# Patient Record
Sex: MALE | Race: ASIAN | NOT HISPANIC OR LATINO
[De-identification: names, ages, dates, MRNs, and addresses within clinical notes are randomized per-mention and may not be internally consistent; named-entity substitution may affect disease eponyms.]

---

## 2021-01-05 ENCOUNTER — APPOINTMENT (OUTPATIENT)
Dept: UROLOGY | Facility: CLINIC | Age: 66
End: 2021-01-05

## 2021-01-19 ENCOUNTER — APPOINTMENT (OUTPATIENT)
Dept: UROLOGY | Facility: CLINIC | Age: 66
End: 2021-01-19
Payer: MEDICARE

## 2021-01-19 VITALS
SYSTOLIC BLOOD PRESSURE: 114 MMHG | DIASTOLIC BLOOD PRESSURE: 73 MMHG | BODY MASS INDEX: 25.66 KG/M2 | HEART RATE: 83 BPM | WEIGHT: 154 LBS | RESPIRATION RATE: 18 BRPM | TEMPERATURE: 98.3 F | HEIGHT: 65 IN | OXYGEN SATURATION: 96 %

## 2021-01-19 DIAGNOSIS — Z78.9 OTHER SPECIFIED HEALTH STATUS: ICD-10-CM

## 2021-01-19 PROCEDURE — 99072 ADDL SUPL MATRL&STAF TM PHE: CPT

## 2021-01-19 PROCEDURE — 99204 OFFICE O/P NEW MOD 45 MIN: CPT

## 2021-01-19 RX ORDER — LATANOPROST/PF 0.005 %
0.01 DROPS OPHTHALMIC (EYE)
Refills: 0 | Status: ACTIVE | COMMUNITY

## 2021-01-19 RX ORDER — ARTIFICIAL TEARS 1; 2; 3 MG/ML; MG/ML; MG/ML
SOLUTION/ DROPS OPHTHALMIC
Refills: 0 | Status: ACTIVE | COMMUNITY

## 2021-01-19 RX ORDER — LORATADINE 10 MG/1
10 TABLET ORAL
Refills: 0 | Status: ACTIVE | COMMUNITY

## 2021-01-19 RX ORDER — OMEPRAZOLE 20 MG/1
20 CAPSULE, DELAYED RELEASE ORAL
Refills: 0 | Status: ACTIVE | COMMUNITY

## 2021-01-19 RX ORDER — ISOPROPYL ALCOHOL 70 ML/100ML
SWAB TOPICAL
Refills: 0 | Status: ACTIVE | COMMUNITY

## 2021-01-19 RX ORDER — GLIPIZIDE 10 MG/1
10 TABLET, FILM COATED, EXTENDED RELEASE ORAL
Refills: 0 | Status: ACTIVE | COMMUNITY

## 2021-01-19 RX ORDER — ATORVASTATIN CALCIUM 40 MG/1
40 TABLET, FILM COATED ORAL
Refills: 0 | Status: ACTIVE | COMMUNITY

## 2021-01-19 RX ORDER — SITAGLIPTIN AND METFORMIN HYDROCHLORIDE 50; 500 MG/1; MG/1
50-500 TABLET, FILM COATED ORAL
Refills: 0 | Status: ACTIVE | COMMUNITY

## 2021-01-19 RX ORDER — LANCETS 33 GAUGE
EACH MISCELLANEOUS
Refills: 0 | Status: ACTIVE | COMMUNITY

## 2021-01-19 RX ORDER — LOSARTAN POTASSIUM 100 MG/1
100 TABLET, FILM COATED ORAL
Refills: 0 | Status: ACTIVE | COMMUNITY

## 2021-01-19 RX ORDER — ACETAMINOPHEN 325 MG/1
325 TABLET ORAL
Refills: 0 | Status: ACTIVE | COMMUNITY

## 2021-01-19 RX ORDER — ICOSAPENT ETHYL 1000 MG/1
1 CAPSULE ORAL
Refills: 0 | Status: ACTIVE | COMMUNITY

## 2021-01-19 RX ORDER — TRIAMCINOLONE ACETONIDE 1 MG/ML
0.1 LOTION TOPICAL
Refills: 0 | Status: ACTIVE | COMMUNITY

## 2021-01-19 RX ORDER — OLOPATADINE HYDROCHLORIDE 2 MG/ML
0.2 SOLUTION OPHTHALMIC
Refills: 0 | Status: ACTIVE | COMMUNITY

## 2021-01-19 RX ORDER — AMOXICILLIN 500 MG/1
500 TABLET, FILM COATED ORAL
Refills: 0 | Status: ACTIVE | COMMUNITY

## 2021-01-19 RX ORDER — EMPAGLIFLOZIN 25 MG/1
25 TABLET, FILM COATED ORAL
Refills: 0 | Status: ACTIVE | COMMUNITY

## 2021-01-19 RX ORDER — ENEMA 19; 7 G/133ML; G/133ML
7-19 ENEMA RECTAL
Qty: 2 | Refills: 0 | Status: ACTIVE | COMMUNITY
Start: 2021-01-19 | End: 1900-01-01

## 2021-01-19 RX ORDER — BENZONATATE 200 MG/1
200 CAPSULE ORAL
Refills: 0 | Status: ACTIVE | COMMUNITY

## 2021-01-19 NOTE — ADDENDUM
[FreeTextEntry1] : Entered by Klaus Browne, acting as scribe for Dr. Edmond Patel.\par \par The documentation recorded by the scribe accurately reflects the service I personally performed and the decisions made by me.\par

## 2021-01-19 NOTE — LETTER BODY
[FreeTextEntry1] : Rupert Ozuna MD\par [address] [facility] [address],\[place] [address]\par [phone]\par \par Dear Dr. Ozuna,\par \par Reason for Visit: Prostate cancer.\par \par This is a 65 year-old Mandarin-speaking gentleman with prostate cancer. Patient is referred for evaluation of his condition. The patient was diagnosed with prostate cancer in 2019. He underwent two prostate biopsies with Dr. Chacko, both of which demonstrated small portions of [place] 6 adenocarcinoma of the prostate. Patient denies any gross hematuria or dysuria or urinary incontinence. The patient denies any aggravating or relieving factors. The patient denies any interference of function. The patient is entirely asymptomatic. All other review of systems are negative. He has no cancer in his family medical history. He has had appendectomy and prostate biopsy. Past medical history, family history and social history were inquired and were noncontributory to current condition. The patient does not use tobacco or drink alcohol. Medications and allergies were reviewed. He has no known allergies to medication. \par \par On examination, the patient is a healthy-appearing gentleman in no acute distress. He is alert and oriented follows commands. He has normal mood and affect. He is normocephalic. Oral no thrush. Neck is supple. Respirations are unlabored. His abdomen is soft and nontender. Liver is nonpalpable. Bladder is nonpalpable. No CVA tenderness. Neurologically he is grossly intact. No peripheral edema. Skin without gross abnormality. He has normal male external genitalia. Normal meatus. Bilateral testes are descended intrascrotally and normal to palpation. On rectal examination, there is normal sphincter tone. The prostate is clinically benign without focal induration or nodularity.\par \par Assessment: Prostate cancer.\par \par I counseled the patient. I discussed the treatment options available to him including expectant management, hormonal therapy, radiation, and surgery. The risks and benefits of each options were discussed in detail as well quality of life issues. The patient wishes to proceed with watchful waiting of his prostate cancer. I recommended he undergo prostate MRI. I counseled the patient regarding the procedure. The risks and benefits were discussed. Alternatives were given. I answered the patient questions. The patient will take the necessary preparations for the procedure, including fleet enema. He will obtain BMP and PSA today to ensure stability. The patient will follow-up in 6 months for serial PSA testing and active surveillance of his prostate cancer. Risks and alternatives were discussed. I answered the patient questions. The patient will follow-up as directed and will contact me with any questions or concerns. Thank you for the opportunity to participate in the care of Mr. ROBERTSON. I will keep you updated on his progress.\par \par Plan: BMP. PSA. Prostate MRI. Fleet enema. Follow-up in 6 months.

## 2021-01-20 LAB
ANION GAP SERPL CALC-SCNC: 21 MMOL/L
APPEARANCE: CLEAR
BACTERIA: NEGATIVE
BILIRUBIN URINE: NEGATIVE
BLOOD URINE: NEGATIVE
BUN SERPL-MCNC: 16 MG/DL
CALCIUM SERPL-MCNC: 10 MG/DL
CHLORIDE SERPL-SCNC: 99 MMOL/L
CO2 SERPL-SCNC: 22 MMOL/L
COLOR: NORMAL
CREAT SERPL-MCNC: 0.66 MG/DL
GLUCOSE QUALITATIVE U: ABNORMAL
GLUCOSE SERPL-MCNC: 110 MG/DL
HYALINE CASTS: 0 /LPF
KETONES URINE: NEGATIVE
LEUKOCYTE ESTERASE URINE: NEGATIVE
MICROSCOPIC-UA: NORMAL
NITRITE URINE: NEGATIVE
PH URINE: 6.5
POTASSIUM SERPL-SCNC: 4 MMOL/L
PROTEIN URINE: NEGATIVE
PSA FREE FLD-MCNC: 14 %
PSA FREE SERPL-MCNC: 0.91 NG/ML
PSA SERPL-MCNC: 6.66 NG/ML
RED BLOOD CELLS URINE: 0 /HPF
SODIUM SERPL-SCNC: 142 MMOL/L
SPECIFIC GRAVITY URINE: 1.03
SQUAMOUS EPITHELIAL CELLS: 0 /HPF
UROBILINOGEN URINE: NORMAL
WHITE BLOOD CELLS URINE: 0 /HPF

## 2021-02-03 LAB — URINE CYTOLOGY: NORMAL

## 2021-02-22 ENCOUNTER — NON-APPOINTMENT (OUTPATIENT)
Age: 66
End: 2021-02-22

## 2021-03-02 ENCOUNTER — APPOINTMENT (OUTPATIENT)
Dept: UROLOGY | Facility: CLINIC | Age: 66
End: 2021-03-02
Payer: COMMERCIAL

## 2021-03-02 VITALS
DIASTOLIC BLOOD PRESSURE: 88 MMHG | BODY MASS INDEX: 25.96 KG/M2 | RESPIRATION RATE: 18 BRPM | TEMPERATURE: 97.7 F | SYSTOLIC BLOOD PRESSURE: 132 MMHG | HEART RATE: 93 BPM | WEIGHT: 156 LBS | OXYGEN SATURATION: 95 %

## 2021-03-02 PROCEDURE — 99072 ADDL SUPL MATRL&STAF TM PHE: CPT

## 2021-03-02 PROCEDURE — 99214 OFFICE O/P EST MOD 30 MIN: CPT

## 2021-03-02 NOTE — LETTER BODY
[FreeTextEntry1] : Rupert Ozuna MD\par [address] [facility] [address],\[place] [address]\par [phone]\par \par Dear Dr. Ozuna,\par \par Reason for Visit: Prostate cancer.\par \par This is a 65 year-old Mandarin-speaking gentleman with prostate cancer. He was diagnosed with prostate cancer in 2019. He underwent two prostate biopsies with Dr. Chacko, both of which demonstrated small portions of [place] 6 adenocarcinoma of the prostate. The patient returns today for follow-up. Since his last visit, the patient underwent prostate MRI in February, which demonstrated an enlarged prostate gland with an intermediate prostatic lesion, PI-RADS 3. Patient denies any gross hematuria or dysuria or urinary incontinence. He denies any interference of function. The patient is entirely asymptomatic. Patient denies any changes in health. All other review of systems are negative. Past medical history, family history and social history were inquired and were unchanged. Medications and allergies were reviewed. He has no known allergies to medication. \par \par On examination, the patient is a healthy-appearing gentleman in no acute distress. He is alert and oriented follows commands. He has normal mood and affect. He is normocephalic. Oral no thrush. Neck is supple. Respirations are unlabored. His abdomen is soft and nontender. Liver is nonpalpable. Bladder is nonpalpable. No CVA tenderness. Neurologically he is grossly intact. No peripheral edema. Skin without gross abnormality. He has normal male external genitalia. Normal meatus. Bilateral testes are descended intrascrotally and normal to palpation. On rectal examination, there is normal sphincter tone. The prostate is clinically benign without focal induration or nodularity.\par \par I personally reviewed MRI prostate images with the patient today and images demonstrated a moderate to markedly enlarged prostate gland of 81 cc with an intermediate 7 mm prostatic lesion, PI-RADS 3. His PSA density is age-appropriate.\par \par Assessment: Prostate cancer on active surveillance \par \par I counseled the patient. His prostate MRI demonstrated an enlarged prostate gland with an intermediate prostatic lesion, PI-RADS 3. However, his PSA density is age-appropriate. I discussed with him the risk of occult malignancy. I also discussed with the patient that he is a favorable candidate for active surveillance. The patient wishes to proceed with active surveillance of his prostate cancer. He will follow-up in 6 months. Risks and alternatives were discussed. I answered the patient questions. The patient will follow-up as directed and will contact me with any questions or concerns. Thank you for the opportunity to participate in the care of Mr. ROBERTSON. I will keep you updated on his progress.\par \par Plan: Follow-up in 6 months.\par \par I spent 30-minutes time today on all issues related to this encounter on today date of service including non face to face time.

## 2021-09-07 ENCOUNTER — APPOINTMENT (OUTPATIENT)
Dept: UROLOGY | Facility: CLINIC | Age: 66
End: 2021-09-07

## 2021-09-15 ENCOUNTER — APPOINTMENT (OUTPATIENT)
Dept: UROLOGY | Facility: CLINIC | Age: 66
End: 2021-09-15

## 2022-05-27 ENCOUNTER — APPOINTMENT (OUTPATIENT)
Dept: UROLOGY | Facility: CLINIC | Age: 67
End: 2022-05-27
Payer: MEDICARE

## 2022-05-27 VITALS
OXYGEN SATURATION: 95 % | TEMPERATURE: 98.5 F | RESPIRATION RATE: 18 BRPM | SYSTOLIC BLOOD PRESSURE: 110 MMHG | HEART RATE: 85 BPM | DIASTOLIC BLOOD PRESSURE: 63 MMHG | WEIGHT: 157 LBS | BODY MASS INDEX: 26.13 KG/M2

## 2022-05-27 DIAGNOSIS — C61 MALIGNANT NEOPLASM OF PROSTATE: ICD-10-CM

## 2022-05-27 DIAGNOSIS — Z00.00 ENCOUNTER FOR GENERAL ADULT MEDICAL EXAMINATION W/OUT ABNORMAL FINDINGS: ICD-10-CM

## 2022-05-27 PROCEDURE — 99214 OFFICE O/P EST MOD 30 MIN: CPT

## 2022-05-27 RX ORDER — TAMSULOSIN HYDROCHLORIDE 0.4 MG/1
0.4 CAPSULE ORAL
Qty: 180 | Refills: 3 | Status: ACTIVE | COMMUNITY
Start: 1900-01-01 | End: 1900-01-01

## 2022-05-28 LAB
ANION GAP SERPL CALC-SCNC: 14 MMOL/L
BUN SERPL-MCNC: 19 MG/DL
CALCIUM SERPL-MCNC: 10 MG/DL
CHLORIDE SERPL-SCNC: 98 MMOL/L
CO2 SERPL-SCNC: 27 MMOL/L
CREAT SERPL-MCNC: 0.62 MG/DL
EGFR: 105 ML/MIN/1.73M2
GLUCOSE SERPL-MCNC: 209 MG/DL
POTASSIUM SERPL-SCNC: 4.8 MMOL/L
PSA FREE FLD-MCNC: 14 %
PSA FREE SERPL-MCNC: 0.93 NG/ML
PSA SERPL-MCNC: 6.43 NG/ML
SODIUM SERPL-SCNC: 140 MMOL/L

## 2022-05-30 NOTE — LETTER BODY
[FreeTextEntry1] : Rupert Ozuna MD\par [address] [facility] [address],\[place] [address]\par [phone]\par \par Dear Dr. Ozuna,\par \par Reason for Visit: Prostate cancer. BPH. \par \par This is a 67 year-old Mandarin-speaking gentleman with prostate cancer and BPH. He was diagnosed with prostate cancer in 2019. He underwent two prostate biopsies with Dr. Chacko, both of which demonstrated small portions of [place] 6 adenocarcinoma of the prostate. His prostate MRI  in February 2021 demonstrated an enlarged prostate gland with an intermediate prostatic lesion, PI-RADS 3. The patient returns today for follow-up. Since his last visit, the patient reports taking Flomax QD regularly without any side effects or difficulties with the medication. He notes good uroflow and stable urinary symptoms on medical therapy. Patient denies any gross hematuria or dysuria or urinary incontinence. The patient denies any changes in health. All other review of systems are negative. Past medical history, family history and social history were inquired and were unchanged. Medications and allergies were reviewed. He has no known allergies to medication. \par \par On examination, the patient is a healthy-appearing gentleman in no acute distress. He is alert and oriented follows commands. He has normal mood and affect. He is normocephalic. Oral no thrush. Neck is supple. Respirations are unlabored. His abdomen is soft and nontender. Liver is nonpalpable. Bladder is nonpalpable. No CVA tenderness. Neurologically he is grossly intact. No peripheral edema. Skin without gross abnormality. He has normal male external genitalia. Normal meatus. Bilateral testes are descended intrascrotally and normal to palpation. On rectal examination, there is normal sphincter tone. The prostate is clinically benign without focal induration or nodularity.\par \par His CMP demonstrated normal renal functions, creatinine 0.58. His PSA was 5.6, which is elevated but age appropriate. \par \par Assessment: Prostate cancer on active surveillance. BPH, stable symptoms on Flomax QD. \par \par I counseled the patient. He is doing well. In terms of his prostate cancer, I discussed with the patient that he is a favorable candidate for active surveillance. I recommended he repeat PSA and BMP today to ensure stability. In terms of his BPH, the patient reports stable urinary symptoms on medical therapy.  I recommended the patient continue taking Flomax QD. I renewed the patient's prescription for Flomax today. I encouraged the patient to continue medications regularly as directed. Risks and alternatives were discussed. I answered the patient questions. The patient will follow-up as directed and will contact me with any questions or concerns. Thank you for the opportunity to participate in the care of Mr. ROBERTSON. I will keep you updated on his progress.\par \par Plan: Continue Flomax. PSA. BMP. Follow-up in 6 months.

## 2022-05-30 NOTE — ADDENDUM
[FreeTextEntry1] : Entered by Petr Jasso, acting as scribe for Dr. Edmond Patel.\par \par The documentation recorded by the scribe accurately reflects the service I personally performed and the decisions made by me.

## 2022-11-18 ENCOUNTER — APPOINTMENT (OUTPATIENT)
Dept: UROLOGY | Facility: CLINIC | Age: 67
End: 2022-11-18

## 2023-01-20 ENCOUNTER — APPOINTMENT (OUTPATIENT)
Dept: UROLOGY | Facility: CLINIC | Age: 68
End: 2023-01-20

## 2023-02-14 ENCOUNTER — APPOINTMENT (OUTPATIENT)
Dept: UROLOGY | Facility: CLINIC | Age: 68
End: 2023-02-14

## 2023-02-17 ENCOUNTER — NON-APPOINTMENT (OUTPATIENT)
Age: 68
End: 2023-02-17

## 2023-02-24 ENCOUNTER — NON-APPOINTMENT (OUTPATIENT)
Age: 68
End: 2023-02-24